# Patient Record
Sex: MALE | Race: BLACK OR AFRICAN AMERICAN | Employment: FULL TIME | URBAN - METROPOLITAN AREA
[De-identification: names, ages, dates, MRNs, and addresses within clinical notes are randomized per-mention and may not be internally consistent; named-entity substitution may affect disease eponyms.]

---

## 2024-11-07 ENCOUNTER — OFFICE VISIT (OUTPATIENT)
Age: 19
End: 2024-11-07

## 2024-11-07 VITALS
DIASTOLIC BLOOD PRESSURE: 75 MMHG | WEIGHT: 156 LBS | HEIGHT: 75 IN | HEART RATE: 55 BPM | SYSTOLIC BLOOD PRESSURE: 117 MMHG | OXYGEN SATURATION: 98 % | TEMPERATURE: 97.5 F | BODY MASS INDEX: 19.4 KG/M2

## 2024-11-07 DIAGNOSIS — S93.602A FOOT SPRAIN, LEFT, INITIAL ENCOUNTER: Primary | ICD-10-CM

## 2024-11-07 NOTE — PROGRESS NOTES
Cleveland Joel (:  2005) is a 19 y.o. male,New patient, here for evaluation of the following chief complaint(s):  Ankle Pain (Pt. Was playing basketball yesterday, jumped up to shoot and came back down and twisted left ankle x yesterday)      ASSESSMENT/PLAN:    ICD-10-CM    1. Foot sprain, left, initial encounter  S93.602A XR ANKLE LEFT (MIN 3 VIEWS)        Left Ankle XR:  IMPRESSION:  No acute findings    Patient's left ankle xray shows no fracture or dislocation. Patient is experiencing a left foot sprain. He was placed in an ACE wrap during his visit. Encouraged him to alternate tylenol and ibuprofen, elevate, ice for 20 minutes at a time, and keep wrap in place while awake. Return for worsening or persistent symptoms. He is understanding and agreeable to this plan.    Dx Disposition: fx, contusion, strain  Education and handout provided on diagnosis and management of symptoms.   AVS reviewed with patient. Follow up as needed in UC or with PCP for new or worsening symptoms.   Return if symptoms worsen or fail to improve.    SUBJECTIVE/OBJECTIVE:  Patient presents to the clinic with complaints of left ankle pain. This started yesterday when he was playing basketball and twisted it. He has tried ibuprofen with relief.        History provided by:  Patient   used: No    Ankle Pain         Vitals:    24 1107   BP: 117/75   Site: Right Upper Arm   Position: Sitting   Cuff Size: Medium Adult   Pulse: 55   Temp: 97.5 °F (36.4 °C)   TempSrc: Oral   SpO2: 98%   Weight: 70.8 kg (156 lb)   Height: 1.905 m (6' 3\")       Review of Systems   Musculoskeletal:  Positive for arthralgias (left ankle) and gait problem (painful with pressure).       Physical Exam  Constitutional:       General: He is not in acute distress.     Appearance: Normal appearance. He is not ill-appearing.   HENT:      Nose: Nose normal.      Mouth/Throat:      Mouth: Mucous membranes are moist.   Cardiovascular:      Rate